# Patient Record
Sex: FEMALE | Race: WHITE | NOT HISPANIC OR LATINO | Employment: FULL TIME | URBAN - METROPOLITAN AREA
[De-identification: names, ages, dates, MRNs, and addresses within clinical notes are randomized per-mention and may not be internally consistent; named-entity substitution may affect disease eponyms.]

---

## 2018-05-12 ENCOUNTER — APPOINTMENT (EMERGENCY)
Dept: RADIOLOGY | Facility: HOSPITAL | Age: 23
End: 2018-05-12
Payer: COMMERCIAL

## 2018-05-12 ENCOUNTER — HOSPITAL ENCOUNTER (EMERGENCY)
Facility: HOSPITAL | Age: 23
Discharge: HOME | End: 2018-05-13
Attending: EMERGENCY MEDICINE
Payer: COMMERCIAL

## 2018-05-12 DIAGNOSIS — R10.9 ABDOMINAL PAIN: Primary | ICD-10-CM

## 2018-05-12 LAB
ALBUMIN SERPL BCP-MCNC: 3.6 G/DL (ref 3.5–5)
ALP SERPL-CCNC: 45 U/L (ref 46–116)
ALT SERPL W P-5'-P-CCNC: 21 U/L (ref 12–78)
ANION GAP SERPL CALCULATED.3IONS-SCNC: 8 MMOL/L (ref 4–13)
AST SERPL W P-5'-P-CCNC: 17 U/L (ref 5–45)
BACTERIA UR QL AUTO: ABNORMAL /HPF
BASOPHILS # BLD AUTO: 0.03 THOUSANDS/ΜL (ref 0–0.1)
BASOPHILS NFR BLD AUTO: 0 % (ref 0–1)
BILIRUB SERPL-MCNC: 0.45 MG/DL (ref 0.2–1)
BILIRUB UR QL STRIP: NEGATIVE
BUN SERPL-MCNC: 12 MG/DL (ref 5–25)
CALCIUM SERPL-MCNC: 9 MG/DL (ref 8.3–10.1)
CHLORIDE SERPL-SCNC: 104 MMOL/L (ref 100–108)
CLARITY UR: CLEAR
CO2 SERPL-SCNC: 25 MMOL/L (ref 21–32)
COLOR UR: YELLOW
COLOR, POC: YELLOW
CREAT SERPL-MCNC: 0.82 MG/DL (ref 0.6–1.3)
EOSINOPHIL # BLD AUTO: 0.16 THOUSAND/ΜL (ref 0–0.61)
EOSINOPHIL NFR BLD AUTO: 2 % (ref 0–6)
ERYTHROCYTE [DISTWIDTH] IN BLOOD BY AUTOMATED COUNT: 11.6 % (ref 11.6–15.1)
EXT PREG TEST URINE: NEGATIVE
GFR SERPL CREATININE-BSD FRML MDRD: 101 ML/MIN/1.73SQ M
GLUCOSE SERPL-MCNC: 102 MG/DL (ref 65–140)
GLUCOSE UR STRIP-MCNC: NEGATIVE MG/DL
HCT VFR BLD AUTO: 40.6 % (ref 34.8–46.1)
HGB BLD-MCNC: 13.8 G/DL (ref 11.5–15.4)
HGB UR QL STRIP.AUTO: ABNORMAL
HYALINE CASTS #/AREA URNS LPF: ABNORMAL /LPF
KETONES UR STRIP-MCNC: NEGATIVE MG/DL
LEUKOCYTE ESTERASE UR QL STRIP: NEGATIVE
LYMPHOCYTES # BLD AUTO: 2.72 THOUSANDS/ΜL (ref 0.6–4.47)
LYMPHOCYTES NFR BLD AUTO: 28 % (ref 14–44)
MCH RBC QN AUTO: 31.2 PG (ref 26.8–34.3)
MCHC RBC AUTO-ENTMCNC: 34 G/DL (ref 31.4–37.4)
MCV RBC AUTO: 92 FL (ref 82–98)
MONOCYTES # BLD AUTO: 1.13 THOUSAND/ΜL (ref 0.17–1.22)
MONOCYTES NFR BLD AUTO: 11 % (ref 4–12)
NEUTROPHILS # BLD AUTO: 5.82 THOUSANDS/ΜL (ref 1.85–7.62)
NEUTS SEG NFR BLD AUTO: 59 % (ref 43–75)
NITRITE UR QL STRIP: NEGATIVE
NON-SQ EPI CELLS URNS QL MICRO: ABNORMAL /HPF
NRBC BLD AUTO-RTO: 0 /100 WBCS
PH UR STRIP.AUTO: 7 [PH] (ref 4.5–8)
PLATELET # BLD AUTO: 243 THOUSANDS/UL (ref 149–390)
PMV BLD AUTO: 9 FL (ref 8.9–12.7)
POTASSIUM SERPL-SCNC: 3.2 MMOL/L (ref 3.5–5.3)
PROT SERPL-MCNC: 7.2 G/DL (ref 6.4–8.2)
PROT UR STRIP-MCNC: NEGATIVE MG/DL
RBC # BLD AUTO: 4.43 MILLION/UL (ref 3.81–5.12)
RBC #/AREA URNS AUTO: ABNORMAL /HPF
SODIUM SERPL-SCNC: 137 MMOL/L (ref 136–145)
SP GR UR STRIP.AUTO: 1.02 (ref 1–1.03)
UROBILINOGEN UR QL STRIP.AUTO: 1 E.U./DL
WBC # BLD AUTO: 9.88 THOUSAND/UL (ref 4.31–10.16)
WBC #/AREA URNS AUTO: ABNORMAL /HPF

## 2018-05-12 PROCEDURE — 81025 URINE PREGNANCY TEST: CPT | Performed by: EMERGENCY MEDICINE

## 2018-05-12 PROCEDURE — 81001 URINALYSIS AUTO W/SCOPE: CPT

## 2018-05-12 PROCEDURE — 85025 COMPLETE CBC W/AUTO DIFF WBC: CPT | Performed by: EMERGENCY MEDICINE

## 2018-05-12 PROCEDURE — 76856 US EXAM PELVIC COMPLETE: CPT

## 2018-05-12 PROCEDURE — 96375 TX/PRO/DX INJ NEW DRUG ADDON: CPT

## 2018-05-12 PROCEDURE — 96374 THER/PROPH/DIAG INJ IV PUSH: CPT

## 2018-05-12 PROCEDURE — 36415 COLL VENOUS BLD VENIPUNCTURE: CPT | Performed by: EMERGENCY MEDICINE

## 2018-05-12 PROCEDURE — 81002 URINALYSIS NONAUTO W/O SCOPE: CPT | Performed by: EMERGENCY MEDICINE

## 2018-05-12 PROCEDURE — 80053 COMPREHEN METABOLIC PANEL: CPT | Performed by: EMERGENCY MEDICINE

## 2018-05-12 RX ORDER — NORETHINDRONE ACETATE AND ETHINYL ESTRADIOL 1; 5 MG/1; UG/1
TABLET ORAL DAILY
COMMUNITY

## 2018-05-12 RX ORDER — ESCITALOPRAM OXALATE 10 MG/1
10 TABLET ORAL
COMMUNITY

## 2018-05-12 RX ORDER — MORPHINE SULFATE 2 MG/ML
2 INJECTION, SOLUTION INTRAMUSCULAR; INTRAVENOUS ONCE
Status: COMPLETED | OUTPATIENT
Start: 2018-05-12 | End: 2018-05-12

## 2018-05-12 RX ORDER — KETOROLAC TROMETHAMINE 30 MG/ML
15 INJECTION, SOLUTION INTRAMUSCULAR; INTRAVENOUS ONCE
Status: COMPLETED | OUTPATIENT
Start: 2018-05-12 | End: 2018-05-12

## 2018-05-12 RX ADMIN — MORPHINE SULFATE 2 MG: 2 INJECTION, SOLUTION INTRAMUSCULAR; INTRAVENOUS at 21:54

## 2018-05-12 RX ADMIN — KETOROLAC TROMETHAMINE 15 MG: 30 INJECTION, SOLUTION INTRAMUSCULAR at 21:54

## 2018-05-13 VITALS
RESPIRATION RATE: 18 BRPM | TEMPERATURE: 97.2 F | HEART RATE: 83 BPM | DIASTOLIC BLOOD PRESSURE: 78 MMHG | OXYGEN SATURATION: 98 % | HEIGHT: 68 IN | BODY MASS INDEX: 18.19 KG/M2 | WEIGHT: 120 LBS | SYSTOLIC BLOOD PRESSURE: 105 MMHG

## 2018-05-13 PROCEDURE — 99284 EMERGENCY DEPT VISIT MOD MDM: CPT

## 2018-05-13 RX ORDER — DICYCLOMINE HCL 20 MG
20 TABLET ORAL 2 TIMES DAILY
Qty: 20 TABLET | Refills: 0 | Status: SHIPPED | OUTPATIENT
Start: 2018-05-13 | End: 2019-01-21

## 2018-05-13 NOTE — ED ATTENDING ATTESTATION
Devon CUBA DO, saw and evaluated the patient  I have discussed the patient with the resident/non-physician practitioner and agree with the resident's/non-physician practitioner's findings, Plan of Care, and MDM as documented in the resident's/non-physician practitioner's note, except where noted  All available labs and Radiology studies were reviewed  At this point I agree with the current assessment done in the Emergency Department  I have conducted an independent evaluation of this patient a history and physical is as follows:      Critical Care Time  CritCare Time    Procedures     21 yr old fem to the ED with abd pain starting after she saw a movie  Had popcorn and a coke  Hx of abd pain frequently after eating  Tried to have BM but unable  Exm: tender in the LLQ above the pelvis with vol guarding  No distention or tympany  Hx of ovarian cysts in family  Due for menses  Used to have more pain with menses but better now with hormones  PlN eval for ovaries and sx tx

## 2018-05-13 NOTE — DISCHARGE INSTRUCTIONS
Abdominal Pain   WHAT YOU NEED TO KNOW:   Abdominal pain can be dull, achy, or sharp  You may have pain in one area of your abdomen, or in your entire abdomen  Your pain may be caused by a condition such as constipation, food sensitivity or poisoning, infection, or a blockage  Abdominal pain can also be from a hernia, appendicitis, or an ulcer  Liver, gallbladder, or kidney conditions can also cause abdominal pain  The cause of your abdominal pain may be unknown  DISCHARGE INSTRUCTIONS:   Return to the emergency department if:   · You have new chest pain or shortness of breath  · You have pulsing pain in your upper abdomen or lower back that suddenly becomes constant  · Your pain is in the right lower abdominal area and worsens with movement  · You have a fever over 100 4°F (38°C) or shaking chills  · You are vomiting and cannot keep food or liquids down  · Your pain does not improve or gets worse over the next 8 to 12 hours  · You see blood in your vomit or bowel movements, or they look black and tarry  · Your skin or the whites of your eyes turn yellow  · You are a woman and have a large amount of vaginal bleeding that is not your monthly period  Contact your healthcare provider if:   · You have pain in your lower back  · You are a man and have pain in your testicles  · You have pain when you urinate  · You have questions or concerns about your condition or care  Follow up with your healthcare provider within 24 hours or as directed:  Write down your questions so you remember to ask them during your visits  Medicines:   · Medicines  may be given to calm your stomach and prevent vomiting or to decrease pain  Ask how to take pain medicine safely  · Take your medicine as directed  Contact your healthcare provider if you think your medicine is not helping or if you have side effects  Tell him of her if you are allergic to any medicine   Keep a list of the medicines, vitamins, and herbs you take  Include the amounts, and when and why you take them  Bring the list or the pill bottles to follow-up visits  Carry your medicine list with you in case of an emergency  © 2017 [employer] Information is for End User's use only and may not be sold, redistributed or otherwise used for commercial purposes  All illustrations and images included in CareNotes® are the copyrighted property of A [employer] M [employer]  or Josh Abreu  The above information is an  only  It is not intended as medical advice for individual conditions or treatments  Talk to your doctor, nurse or pharmacist before following any medical regimen to see if it is safe and effective for you

## 2018-05-13 NOTE — ED PROVIDER NOTES
History  Chief Complaint   Patient presents with    Abdominal Pain     Pt c/o LLQ abd pain and diarrhea at the movies theatre with near syncopal episode  GCS 15 upon arrival      HPI       27-year-old female presents for abdominal pain  Sudden onset approximately 45 minutes prior to arrival   Left lower quadrant  Sharp shooting pain  Felt like she had to defecate and went to the bathroom minimally without relief  Felt dizzy and in terms of lightheadedness  Did not pass out  Who patient's mother reports she was in the bathroom for a long time and checked on her and she looked like she was not feeling well  Improved since onset  Still has left lower quadrant pain  Denies vaginal bleeding discharge dysuria  Patient is not sexually active  No other modifying factors no other associated symptoms  Patient reports that she does have symptoms of intestinal cramps some time to time but does not have a diagnosis  on exam the patient has left lower quadrant tenderness without rebound or guarding  Assessment plan: Left lower quadrant abdominal pain  Labs to evaluate for leukocytosis or electrolyte disturbance  UA for infection or pregnancy  Ultrasound of the pelvis to evaluate for ovarian torsion  Prior to Admission Medications   Prescriptions Last Dose Informant Patient Reported? Taking? Cetirizine HCl (ZYRTEC ALLERGY PO)   Yes Yes   Sig: Take by mouth   Probiotic Product (PROBIOTIC DAILY PO)   Yes Yes   Sig: Take by mouth   escitalopram (LEXAPRO) 10 mg tablet   Yes No   Sig: 10 mg   norethindrone-ethinyl estradiol (FEMHRT 1/5) 1-5 MG-MCG TABS   Yes Yes   Sig: Take by mouth daily      Facility-Administered Medications: None       Past Medical History:   Diagnosis Date    Abdominal pain        No past surgical history on file  No family history on file  I have reviewed and agree with the history as documented      Social History   Substance Use Topics    Smoking status: Never Smoker    Smokeless tobacco: Never Used    Alcohol use Yes      Comment: social        Review of Systems   Constitutional: Negative for chills, fatigue and fever  Eyes: Negative for photophobia and visual disturbance  Respiratory: Negative for cough and shortness of breath  Cardiovascular: Negative for chest pain, palpitations and leg swelling  Gastrointestinal: Positive for abdominal pain  Negative for diarrhea, nausea and vomiting  Endocrine: Negative for polydipsia and polyuria  Genitourinary: Negative for decreased urine volume, difficulty urinating, dysuria and frequency  Musculoskeletal: Negative for back pain, neck pain and neck stiffness  Skin: Negative for color change and rash  Allergic/Immunologic: Negative for environmental allergies and immunocompromised state  Neurological: Negative for dizziness and headaches  Hematological: Negative for adenopathy  Does not bruise/bleed easily  Psychiatric/Behavioral: Negative for dysphoric mood  The patient is not nervous/anxious  Physical Exam  ED Triage Vitals [05/12/18 2114]   Temperature Pulse Respirations Blood Pressure SpO2   (!) 97 2 °F (36 2 °C) 73 18 118/72 100 %      Temp Source Heart Rate Source Patient Position - Orthostatic VS BP Location FiO2 (%)   Tympanic Monitor Sitting Left arm --      Pain Score       Worst Possible Pain           Orthostatic Vital Signs  Vitals:    05/12/18 2114 05/12/18 2318 05/13/18 0043   BP: 118/72 109/53 105/78   Pulse: 73 74 83   Patient Position - Orthostatic VS: Sitting Lying        Physical Exam   Constitutional: She is oriented to person, place, and time  She appears well-developed and well-nourished  No distress  HENT:   Head: Normocephalic and atraumatic  Nose: Nose normal    Eyes: Conjunctivae and EOM are normal  Pupils are equal, round, and reactive to light  No scleral icterus  Neck: Normal range of motion  Neck supple  No JVD present  No tracheal deviation present   No thyromegaly present  Cardiovascular: Normal rate, regular rhythm, normal heart sounds and intact distal pulses  Exam reveals no gallop and no friction rub  Pulmonary/Chest: Effort normal and breath sounds normal  No respiratory distress  She has no wheezes  She has no rales  She exhibits no tenderness  Abdominal: Soft  Bowel sounds are normal  She exhibits no distension (llq) and no mass  There is tenderness  There is no rebound and no guarding  No hernia  Musculoskeletal: Normal range of motion  She exhibits no edema, tenderness or deformity  Neurological: She is alert and oriented to person, place, and time  She has normal reflexes  No cranial nerve deficit  Coordination normal    Skin: Skin is warm and dry  She is not diaphoretic  No erythema  Psychiatric: She has a normal mood and affect  Her behavior is normal    Nursing note and vitals reviewed        ED Medications  Medications   ketorolac (TORADOL) injection 15 mg (15 mg Intravenous Given 5/12/18 2154)   morphine injection 2 mg (2 mg Intravenous Given 5/12/18 2154)       Diagnostic Studies  Results Reviewed     Procedure Component Value Units Date/Time    Urine Microscopic [[id number]]  (Abnormal) Collected:  05/12/18 2154    Lab Status:  Final result Specimen:  Urine from Urine, Clean Catch Updated:  05/12/18 2232     RBC, UA 2-4 (A) /hpf      WBC, UA None Seen /hpf      Epithelial Cells None Seen /hpf      Bacteria, UA None Seen /hpf      Hyaline Casts, UA None Seen /lpf     Comprehensive metabolic panel [[id number]]  (Abnormal) Collected:  05/12/18 2151    Lab Status:  Final result Specimen:  Blood from Line, Venous Updated:  05/12/18 2226     Sodium 137 mmol/L      Potassium 3 2 (L) mmol/L      Chloride 104 mmol/L      CO2 25 mmol/L      Anion Gap 8 mmol/L      BUN 12 mg/dL      Creatinine 0 82 mg/dL      Glucose 102 mg/dL      Calcium 9 0 mg/dL      AST 17 U/L      ALT 21 U/L      Alkaline Phosphatase 45 (L) U/L      Total Protein 7 2 g/dL      Albumin 3 6 g/dL      Total Bilirubin 0 45 mg/dL      eGFR 101 ml/min/1 73sq m     Narrative:         National Kidney Disease Education Program recommendations are as follows:  GFR calculation is accurate only with a steady state creatinine  Chronic Kidney disease less than 60 ml/min/1 73 sq  meters  Kidney failure less than 15 ml/min/1 73 sq  meters      POCT urinalysis dipstick [[id number]]  (Normal) Resulted:  05/12/18 2149    Lab Status:  Final result Specimen:  Urine Updated:  05/12/18 2224     Color, UA Yellow    POCT pregnancy, urine [[id number]]  (Normal) Resulted:  05/12/18 2149    Lab Status:  Final result Updated:  05/12/18 2223     EXT PREG TEST UR (Ref: Negative) Negative    CBC and differential [[id number]] Collected:  05/12/18 2151    Lab Status:  Final result Specimen:  Blood from Line, Venous Updated:  05/12/18 2204     WBC 9 88 Thousand/uL      RBC 4 43 Million/uL      Hemoglobin 13 8 g/dL      Hematocrit 40 6 %      MCV 92 fL      MCH 31 2 pg      MCHC 34 0 g/dL      RDW 11 6 %      MPV 9 0 fL      Platelets 663 Thousands/uL      nRBC 0 /100 WBCs      Neutrophils Relative 59 %      Lymphocytes Relative 28 %      Monocytes Relative 11 %      Eosinophils Relative 2 %      Basophils Relative 0 %      Neutrophils Absolute 5 82 Thousands/µL      Lymphocytes Absolute 2 72 Thousands/µL      Monocytes Absolute 1 13 Thousand/µL      Eosinophils Absolute 0 16 Thousand/µL      Basophils Absolute 0 03 Thousands/µL     ED Urine Macroscopic [[id number]]  (Abnormal) Collected:  05/12/18 2154    Lab Status:  Final result Specimen:  Urine Updated:  05/12/18 2149     Color, UA Yellow     Clarity, UA Clear     pH, UA 7 0     Leukocytes, UA Negative     Nitrite, UA Negative     Protein, UA Negative mg/dl      Glucose, UA Negative mg/dl      Ketones, UA Negative mg/dl      Urobilinogen, UA 1 0 E U /dl      Bilirubin, UA Negative     Blood, UA Trace (A)     Specific [place], UA 1 020    Narrative:       [address] [place] [address] pelvis transabdominal only   Final Result by Roseanne Martinez MD (05/13 0032)       Normal                       Workstation performed: [id number]               Procedures  Procedures      Phone Consults  ED Phone Contact    ED Course                               MDM  Number of Diagnoses or Management Options  Abdominal pain: new and requires workup     Amount and/or Complexity of Data Reviewed  Clinical lab tests: ordered and reviewed  Tests in the radiology section of CPT®: reviewed and ordered  Tests in the medicine section of CPT®: reviewed and ordered  Independent visualization of images, tracings, or specimens: yes      CritCare Time    Disposition  Final diagnoses:   Abdominal pain     Time reflects when diagnosis was documented in both MDM as applicable and the Disposition within this note     Time User Action Codes Description Comment    5/13/2018 12:34 AM Dago Larger Add [R10 9] Abdominal pain       ED Disposition     ED Disposition Condition Comment    Discharge  Kimmy Akers discharge to home/self care  Condition at discharge: Good        Follow-up Information     Follow up With Specialties Details Why [address] [place] [address], DO Internal Medicine  As needed [address] [place] [address]          Discharge Medication List as of 5/13/2018 12:35 AM      CONTINUE these medications which have NOT CHANGED    Details   Cetirizine HCl (ZYRTEC ALLERGY PO) Take by mouth, Historical Med      norethindrone-ethinyl estradiol (FEMHRT 1/5) 1-5 MG-MCG TABS Take by mouth daily, Historical Med      Probiotic Product (PROBIOTIC DAILY PO) Take by mouth, Historical Med      escitalopram (LEXAPRO) 10 mg tablet 10 mg, Historical Med           No discharge procedures on file  ED Provider  Attending physically available and evaluated Kimmy Akers I managed the patient along with the ED Attending      Electronically Signed by         Jelly Duff DO  05/13/18 9041

## 2018-07-05 ENCOUNTER — OFFICE VISIT (OUTPATIENT)
Dept: GASTROENTEROLOGY | Facility: CLINIC | Age: 23
End: 2018-07-05
Payer: COMMERCIAL

## 2018-07-05 ENCOUNTER — APPOINTMENT (OUTPATIENT)
Dept: LAB | Facility: HOSPITAL | Age: 23
End: 2018-07-05
Attending: INTERNAL MEDICINE
Payer: COMMERCIAL

## 2018-07-05 ENCOUNTER — TELEPHONE (OUTPATIENT)
Dept: GASTROENTEROLOGY | Facility: CLINIC | Age: 23
End: 2018-07-05

## 2018-07-05 VITALS
HEART RATE: 86 BPM | BODY MASS INDEX: 19.37 KG/M2 | TEMPERATURE: 99 F | HEIGHT: 68 IN | SYSTOLIC BLOOD PRESSURE: 103 MMHG | WEIGHT: 127.8 LBS | DIASTOLIC BLOOD PRESSURE: 69 MMHG

## 2018-07-05 DIAGNOSIS — R10.13 EPIGASTRIC PAIN: Primary | ICD-10-CM

## 2018-07-05 DIAGNOSIS — R10.13 EPIGASTRIC PAIN: ICD-10-CM

## 2018-07-05 DIAGNOSIS — R10.84 GENERALIZED ABDOMINAL PAIN: Primary | ICD-10-CM

## 2018-07-05 DIAGNOSIS — R79.89 ABNORMAL C-REACTIVE PROTEIN: ICD-10-CM

## 2018-07-05 LAB — IGA SERPL-MCNC: 135 MG/DL (ref 70–400)

## 2018-07-05 PROCEDURE — 82784 ASSAY IGA/IGD/IGG/IGM EACH: CPT

## 2018-07-05 PROCEDURE — 36415 COLL VENOUS BLD VENIPUNCTURE: CPT

## 2018-07-05 PROCEDURE — 99244 OFF/OP CNSLTJ NEW/EST MOD 40: CPT | Performed by: INTERNAL MEDICINE

## 2018-07-05 PROCEDURE — 83516 IMMUNOASSAY NONANTIBODY: CPT

## 2018-07-05 RX ORDER — DICYCLOMINE HCL 20 MG
20 TABLET ORAL EVERY 6 HOURS
Qty: 120 TABLET | Refills: 5 | Status: SHIPPED | OUTPATIENT
Start: 2018-07-05 | End: 2019-03-09 | Stop reason: SDUPTHER

## 2018-07-05 NOTE — TELEPHONE ENCOUNTER
Dr Blair Chavira pt    Please call Lili Muñoz from [facility] outpatient lab in [facility] she is requesting to know if the patient should be having a 24hr urine or one sample  The patient is currently there so she would need a response as soon as possible  Thank you [phone]  Text sent to task PA

## 2018-07-05 NOTE — TELEPHONE ENCOUNTER
I believe this is supposed to be 24hrs  I re-entered it & called the lab but she apparently already gave a sample  Can you call & let the pt know    Thanks  Heriberto Plaza

## 2018-07-05 NOTE — PROGRESS NOTES
[facility] - Outpatient Consultation  Kateryna Braxton 21 y o  female MRN: [id number]  Encounter: [id number]          ASSESSMENT AND PLAN:      21year old female with symptoms suggestive of IBS  Will check H pylori, TTG, Urine PBG, as well as CRP and fecal calprotectin  Follow up in a few months time  ______________________________________________________________________    HPI:  21year old female referred by PCP due to abdominal pain  Not really related to food intake  She reports intense abdominal pain before a BM  Sometimes has abdominal pain  Also with some alternating constipation and diarrhea  She went to the movies recently and had some intense abdominal pain after eating popcorn  Weight is stable  Denies hematochezia  Denies family history of colon cancer  Only surgery was wisdom teeth surgery  REVIEW OF SYSTEMS:    CONSTITUTIONAL: Denies any fever, chills, rigors, and weight loss  HEENT: No earache or tinnitus  Denies hearing loss or visual disturbances  CARDIOVASCULAR: No chest pain or palpitations  RESPIRATORY: Denies any cough, hemoptysis, shortness of breath or dyspnea on exertion  GASTROINTESTINAL: As noted in the History of Present Illness  GENITOURINARY: No problems with urination  Denies any hematuria or dysuria  NEUROLOGIC: No dizziness or vertigo, denies headaches  MUSCULOSKELETAL: Denies any muscle or joint pain  SKIN: Denies skin rashes or itching  ENDOCRINE: Denies excessive thirst  Denies intolerance to heat or cold  PSYCHOSOCIAL: Denies depression or anxiety  Denies any recent memory loss  Historical Information   Past Medical History:   Diagnosis Date    Abdominal pain      History reviewed  No pertinent surgical history    Social History   History   Alcohol Use    Yes     Comment: social     History   Drug use: Unknown     History   Smoking Status    Never Smoker   Smokeless Tobacco    Never Used Family History   Problem Relation Age of Onset    Heart disease Mother     Colon cancer Maternal Grandfather     Heart disease Maternal Grandfather     Colon cancer Paternal Grandfather        Meds/Allergies       Current Outpatient Prescriptions:     Cetirizine HCl (ZYRTEC ALLERGY PO)    dicyclomine (BENTYL) 20 mg tablet    escitalopram (LEXAPRO) 10 mg tablet    norethindrone-ethinyl estradiol (FEMHRT 1/5) 1-5 MG-MCG TABS    Probiotic Product (PROBIOTIC DAILY PO)    No Known Allergies      Objective     Blood pressure 103/69, pulse 86, temperature 99 °F (37 2 °C), temperature source Tympanic, height 5' 8" (1 727 m), weight 58 kg (127 lb 12 8 oz)  Body mass index is 19 43 kg/m²  PHYSICAL EXAM:      General Appearance:   Alert, cooperative, no distress   HEENT:   Normocephalic, atraumatic, anicteric      Neck:  Supple, symmetrical, trachea midline   Lungs:   Clear to auscultation bilaterally; no rales, rhonchi or wheezing; respirations unlabored    Heart[de-identified]   Regular rate and rhythm; no murmur, rub, or gallop  Abdomen:   Soft, non-tender, non-distended; normal bowel sounds; no masses, no organomegaly    Genitalia:   Deferred    Rectal:   Deferred    Extremities:  No cyanosis, clubbing or edema    Pulses:  2+ and symmetric    Skin:  No jaundice, rashes, or lesions    Lymph nodes:  No palpable cervical lymphadenopathy        Lab Results:   No visits with results within 1 Day(s) from this visit     Latest known visit with results is:   Admission on 05/12/2018, Discharged on 05/13/2018   Component Date Value    WBC 05/12/2018 9 88     RBC 05/12/2018 4 43     Hemoglobin 05/12/2018 13 8     Hematocrit 05/12/2018 40 6     MCV 05/12/2018 92     MCH 05/12/2018 31 2     MCHC 05/12/2018 34 0     RDW 05/12/2018 11 6     MPV 05/12/2018 9 0     Platelets 36/55/7200 243     nRBC 05/12/2018 0     Neutrophils Relative 05/12/2018 59     Lymphocytes Relative 05/12/2018 28     Monocytes Relative 05/12/2018 11     Eosinophils Relative 05/12/2018 2     Basophils Relative 05/12/2018 0     Neutrophils Absolute 05/12/2018 5 82     Lymphocytes Absolute 05/12/2018 2 72     Monocytes Absolute 05/12/2018 1 13     Eosinophils Absolute 05/12/2018 0 16     Basophils Absolute 05/12/2018 0 03     Sodium 05/12/2018 137     Potassium 05/12/2018 3 2*    Chloride 05/12/2018 104     CO2 05/12/2018 25     Anion Gap 05/12/2018 8     BUN 05/12/2018 12     Creatinine 05/12/2018 0 82     Glucose 05/12/2018 102     Calcium 05/12/2018 9 0     AST 05/12/2018 17     ALT 05/12/2018 21     Alkaline Phosphatase 05/12/2018 45*    Total Protein 05/12/2018 7 2     Albumin 05/12/2018 3 6     Total Bilirubin 05/12/2018 0 45     eGFR 05/12/2018 101     Color, UA 05/12/2018 Yellow     EXT PREG TEST UR (Ref: N* 05/12/2018 Negative     Color, UA 05/12/2018 Yellow     Clarity, UA 05/12/2018 Clear     pH, UA 05/12/2018 7 0     Leukocytes, UA 05/12/2018 Negative     Nitrite, UA 05/12/2018 Negative     Protein, UA 05/12/2018 Negative     Glucose, UA 05/12/2018 Negative     Ketones, UA 05/12/2018 Negative     Urobilinogen, UA 05/12/2018 1 0     Bilirubin, UA 05/12/2018 Negative     Blood, UA 05/12/2018 Trace*    Specific Gravity, UA 05/12/2018 1 020     RBC, UA 05/12/2018 2-4*    WBC, UA 05/12/2018 None Seen     Epithelial Cells 05/12/2018 None Seen     Bacteria, UA 05/12/2018 None Seen     Hyaline Casts, UA 05/12/2018 None Seen          Radiology Results:   No results found

## 2018-07-05 NOTE — LETTER
July 5, 2018     Misael Escamilla DO  [facility] [address]    Patient: Rebecca Ruby   YOB: 1995   Date of Visit: 7/5/2018       Dear Dr Marguerite Sweet:    Thank you for referring Rebecca Ruby to me for evaluation  Below are my notes for this consultation  If you have questions, please do not hesitate to call me  I look forward to following your patient along with you  Sincerely,        Duncan Caraballo DO        CC: No Recipients  Duncan Caraballo DO  7/5/2018  3:51 PM  Sign at close encounter  [facility] - Outpatient Consultation  Rebecca Ruby 21 y o  female MRN: [id number]  Encounter: [id number]          ASSESSMENT AND PLAN:      21year old female with symptoms suggestive of IBS  Will check H pylori, TTG, Urine PBG, as well as CRP and fecal calprotectin  Follow up in a few months time  ______________________________________________________________________    HPI:  21year old female referred by PCP due to abdominal pain  Not really related to food intake  She reports intense abdominal pain before a BM  Sometimes has abdominal pain  Also with some alternating constipation and diarrhea  She went to the movies recently and had some intense abdominal pain after eating popcorn  Weight is stable  Denies hematochezia  Denies family history of colon cancer  Only surgery was wisdom teeth surgery  REVIEW OF SYSTEMS:    CONSTITUTIONAL: Denies any fever, chills, rigors, and weight loss  HEENT: No earache or tinnitus  Denies hearing loss or visual disturbances  CARDIOVASCULAR: No chest pain or palpitations  RESPIRATORY: Denies any cough, hemoptysis, shortness of breath or dyspnea on exertion  GASTROINTESTINAL: As noted in the History of Present Illness  GENITOURINARY: No problems with urination  Denies any hematuria or dysuria  NEUROLOGIC: No dizziness or vertigo, denies headaches     MUSCULOSKELETAL: Denies any muscle or joint pain  SKIN: Denies skin rashes or itching  ENDOCRINE: Denies excessive thirst  Denies intolerance to heat or cold  PSYCHOSOCIAL: Denies depression or anxiety  Denies any recent memory loss  Historical Information   Past Medical History:   Diagnosis Date    Abdominal pain      History reviewed  No pertinent surgical history  Social History   History   Alcohol Use    Yes     Comment: social     History   Drug use: Unknown     History   Smoking Status    Never Smoker   Smokeless Tobacco    Never Used     Family History   Problem Relation Age of Onset    Heart disease Mother     Colon cancer Maternal Grandfather     Heart disease Maternal Grandfather     Colon cancer Paternal Grandfather        Meds/Allergies       Current Outpatient Prescriptions:     Cetirizine HCl (ZYRTEC ALLERGY PO)    dicyclomine (BENTYL) 20 mg tablet    escitalopram (LEXAPRO) 10 mg tablet    norethindrone-ethinyl estradiol (FEMHRT 1/5) 1-5 MG-MCG TABS    Probiotic Product (PROBIOTIC DAILY PO)    No Known Allergies      Objective     Blood pressure 103/69, pulse 86, temperature 99 °F (37 2 °C), temperature source Tympanic, height 5' 8" (1 727 m), weight 58 kg (127 lb 12 8 oz)  Body mass index is 19 43 kg/m²  PHYSICAL EXAM:      General Appearance:   Alert, cooperative, no distress   HEENT:   Normocephalic, atraumatic, anicteric      Neck:  Supple, symmetrical, trachea midline   Lungs:   Clear to auscultation bilaterally; no rales, rhonchi or wheezing; respirations unlabored    Heart[de-identified]   Regular rate and rhythm; no murmur, rub, or gallop     Abdomen:   Soft, non-tender, non-distended; normal bowel sounds; no masses, no organomegaly    Genitalia:   Deferred    Rectal:   Deferred    Extremities:  No cyanosis, clubbing or edema    Pulses:  2+ and symmetric    Skin:  No jaundice, rashes, or lesions    Lymph nodes:  No palpable cervical lymphadenopathy        Lab Results:   No visits with results within 1 Day(s) from this visit     Latest known visit with results is:   Admission on 05/12/2018, Discharged on 05/13/2018   Component Date Value    WBC 05/12/2018 9 88     RBC 05/12/2018 4 43     Hemoglobin 05/12/2018 13 8     Hematocrit 05/12/2018 40 6     MCV 05/12/2018 92     MCH 05/12/2018 31 2     MCHC 05/12/2018 34 0     RDW 05/12/2018 11 6     MPV 05/12/2018 9 0     Platelets 43/23/1053 243     nRBC 05/12/2018 0     Neutrophils Relative 05/12/2018 59     Lymphocytes Relative 05/12/2018 28     Monocytes Relative 05/12/2018 11     Eosinophils Relative 05/12/2018 2     Basophils Relative 05/12/2018 0     Neutrophils Absolute 05/12/2018 5 82     Lymphocytes Absolute 05/12/2018 2 72     Monocytes Absolute 05/12/2018 1 13     Eosinophils Absolute 05/12/2018 0 16     Basophils Absolute 05/12/2018 0 03     Sodium 05/12/2018 137     Potassium 05/12/2018 3 2*    Chloride 05/12/2018 104     CO2 05/12/2018 25     Anion Gap 05/12/2018 8     BUN 05/12/2018 12     Creatinine 05/12/2018 0 82     Glucose 05/12/2018 102     Calcium 05/12/2018 9 0     AST 05/12/2018 17     ALT 05/12/2018 21     Alkaline Phosphatase 05/12/2018 45*    Total Protein 05/12/2018 7 2     Albumin 05/12/2018 3 6     Total Bilirubin 05/12/2018 0 45     eGFR 05/12/2018 101     Color, UA 05/12/2018 Yellow     EXT PREG TEST UR (Ref: N* 05/12/2018 Negative     Color, UA 05/12/2018 Yellow     Clarity, UA 05/12/2018 Clear     pH, UA 05/12/2018 7 0     Leukocytes, UA 05/12/2018 Negative     Nitrite, UA 05/12/2018 Negative     Protein, UA 05/12/2018 Negative     Glucose, UA 05/12/2018 Negative     Ketones, UA 05/12/2018 Negative     Urobilinogen, UA 05/12/2018 1 0     Bilirubin, UA 05/12/2018 Negative     Blood, UA 05/12/2018 Trace*    Specific Gravity, UA 05/12/2018 1 020     RBC, UA 05/12/2018 2-4*    WBC, UA 05/12/2018 None Seen     Epithelial Cells 05/12/2018 None Seen     Bacteria, UA 05/12/2018 None Seen     Hyaline Casts, UA 05/12/2018 None Seen          Radiology Results:   No results found

## 2018-07-05 NOTE — TELEPHONE ENCOUNTER
Tried calling the patient but her mail box is full so I couldn't leave a message I will try back later

## 2018-07-06 LAB — TTG IGA SER-ACNC: <2 U/ML (ref 0–3)

## 2018-07-09 ENCOUNTER — APPOINTMENT (OUTPATIENT)
Dept: LAB | Facility: HOSPITAL | Age: 23
End: 2018-07-09
Payer: COMMERCIAL

## 2018-07-09 PROCEDURE — 84110 ASSAY OF PORPHOBILINOGEN: CPT | Performed by: PHYSICIAN ASSISTANT

## 2018-07-11 LAB
PBG 24H UR-MRATE: 0.7 MG/24 HR (ref 0–1.5)
PBG UR-SCNC: 1.2 MG/L (ref 0–2)

## 2018-07-14 ENCOUNTER — APPOINTMENT (OUTPATIENT)
Dept: LAB | Facility: HOSPITAL | Age: 23
End: 2018-07-14
Attending: INTERNAL MEDICINE
Payer: COMMERCIAL

## 2018-07-14 DIAGNOSIS — R10.13 EPIGASTRIC PAIN: ICD-10-CM

## 2018-07-14 PROCEDURE — 83993 ASSAY FOR CALPROTECTIN FECAL: CPT

## 2018-07-14 PROCEDURE — 87338 HPYLORI STOOL AG IA: CPT

## 2018-07-16 LAB — H PYLORI AG STL QL IA: NEGATIVE

## 2018-07-22 LAB — CALPROTECTIN STL-MCNT: 26 UG/G (ref 0–120)

## 2018-12-19 ENCOUNTER — TELEPHONE (OUTPATIENT)
Dept: GASTROENTEROLOGY | Facility: MEDICAL CENTER | Age: 23
End: 2018-12-19

## 2018-12-19 NOTE — TELEPHONE ENCOUNTER
I called her, she continues to have symptoms that suggest IBS with alternating constipation and diarrhea associated with abdominal pain  She states that she predominantly has diarrhea  She did try the Bentyl but did not feel this was helpful, she was wondering about a daily medication for her abdominal pain that she recalls you mentioning at her office visit  Do you recall which medication this was? She was last seen in July and I asked her to make a follow-up visit in the office  Scheduling team:  Can you call her to schedule an office visit? She is wondering if we have any availability on 12/26/2018

## 2019-01-21 ENCOUNTER — OFFICE VISIT (OUTPATIENT)
Dept: GASTROENTEROLOGY | Facility: MEDICAL CENTER | Age: 24
End: 2019-01-21
Payer: COMMERCIAL

## 2019-01-21 VITALS
TEMPERATURE: 97.4 F | HEIGHT: 68 IN | SYSTOLIC BLOOD PRESSURE: 112 MMHG | BODY MASS INDEX: 18.94 KG/M2 | HEART RATE: 88 BPM | DIASTOLIC BLOOD PRESSURE: 72 MMHG | WEIGHT: 125 LBS

## 2019-01-21 DIAGNOSIS — R19.8 ABNORMAL BOWEL HABITS: ICD-10-CM

## 2019-01-21 DIAGNOSIS — R10.84 GENERALIZED ABDOMINAL PAIN: Primary | ICD-10-CM

## 2019-01-21 PROCEDURE — 99214 OFFICE O/P EST MOD 30 MIN: CPT | Performed by: PHYSICIAN ASSISTANT

## 2019-01-21 RX ORDER — AMITRIPTYLINE HYDROCHLORIDE 10 MG/1
TABLET, FILM COATED ORAL
Qty: 60 TABLET | Refills: 3 | Status: SHIPPED | OUTPATIENT
Start: 2019-01-21 | End: 2019-01-21 | Stop reason: SDUPTHER

## 2019-01-21 RX ORDER — AMITRIPTYLINE HYDROCHLORIDE 10 MG/1
TABLET, FILM COATED ORAL
Qty: 60 TABLET | Refills: 2 | Status: SHIPPED | OUTPATIENT
Start: 2019-01-21 | End: 2019-04-22 | Stop reason: SDUPTHER

## 2019-01-21 NOTE — PROGRESS NOTES
Assessment/Plan:     Diagnoses and all orders for this visit:    Generalized abdominal pain/ Abnormal bowel habits  She complains of abdominal pain and alternating bowel habits  She likely has IBS  She has had no abdominal imaging would recommend checking a ultrasound  If this is negative can consider CT scan  She has tried Bentyl without relief  She is requesting a medication to help with her pain  I would recommend amitriptyline at bedtime  She will have an EKG prior to this  She will start 10 mg x1 week and then increase to 20mg  I asked her to take this dose for 3-4 weeks and if there was still no improvement to call us  She currently lives in [place] and has started a new job and has minimal PTO time  We also discussed fiber which I would recommend adding to her diet to help with her alternating bowels  If her symptoms do not improve we can consider endoscopy/colonoscopy  -     US abdomen complete; Future  -     ECG 12 lead; Future  -     amitriptyline (ELAVIL) 10 mg tablet; Take 10mg q HS x 1 wk then increase to 20mg              Subjective:      Patient ID: Elzbieta Parsons is a 21 y o  female  HPI     This is a follow-up for abdominal pain and diarrhea  Patient was initially seen in July of last year  She had H pylori, celiac, fecal calprotectin and 24 hr urine collection at that time all of which were within normal limits  She describes her pain as daily, mostly after eating, most times which is relieved after a bowel movement  She states it could be very sharp at times  She has tried Bentyl without relief  She typically has loose frequent stools however at times can be constipated  She states she has tried to keep a food journal and has not been able to identify any triggers  She was seen once in the ER for these symptoms and had a pelvic ultrasound which was within normal limits  She has had no other abdominal imaging  She has never had an endoscopy or colonoscopy      Patient Active Problem List   Diagnosis    Generalized abdominal pain    Abnormal bowel habits     No Known Allergies  Current Outpatient Prescriptions on File Prior to Visit   Medication Sig    dicyclomine (BENTYL) 20 mg tablet Take 1 tablet (20 mg total) by mouth every 6 (six) hours    escitalopram (LEXAPRO) 10 mg tablet 10 mg    norethindrone-ethinyl estradiol (FEMHRT 1/5) 1-5 MG-MCG TABS Take by mouth daily    [DISCONTINUED] Probiotic Product (PROBIOTIC DAILY PO) Take by mouth    [DISCONTINUED] Cetirizine HCl (ZYRTEC ALLERGY PO) Take by mouth    [DISCONTINUED] dicyclomine (BENTYL) 20 mg tablet Take 1 tablet (20 mg total) by mouth 2 (two) times a day     No current facility-administered medications on file prior to visit  Family History   Problem Relation Age of Onset    Heart disease Mother     Colon cancer Maternal Grandfather     Heart disease Maternal Grandfather     Colon cancer Paternal Grandfather      Past Medical History:   Diagnosis Date    Abdominal pain      Social History     Social History    Marital status: Single     Spouse name: N/A    Number of children: N/A    Years of education: N/A     Social History Main Topics    Smoking status: Never Smoker    Smokeless tobacco: Never Used    Alcohol use Yes      Comment: social    Drug use: Unknown    Sexual activity: Not Asked     Other Topics Concern    None     Social History Narrative    None     No past surgical history on file  Review of Systems   All other systems reviewed and are negative  Objective:      /72 (BP Location: Left arm, Patient Position: Sitting, Cuff Size: Adult)   Pulse 88   Temp (!) 97 4 °F (36 3 °C) (Tympanic)   Ht 5' 8" (1 727 m)   Wt 56 7 kg (125 lb)   BMI 19 01 kg/m²          Physical Exam   Constitutional: She is oriented to person, place, and time  She appears well-developed and well-nourished  No distress  HENT:   Head: Normocephalic and atraumatic     Eyes: Conjunctivae and EOM are normal    Neck: Normal range of motion  Cardiovascular: Normal rate and regular rhythm  Pulmonary/Chest: Effort normal and breath sounds normal    Abdominal: Soft  Bowel sounds are normal  There is tenderness (Diffusely)  Musculoskeletal: Normal range of motion  Neurological: She is alert and oriented to person, place, and time  Skin: Skin is warm and dry  Psychiatric: She has a normal mood and affect   Her behavior is normal

## 2019-01-21 NOTE — LETTER
January 21, 2019     [facility] [address]    Patient: Ramiro Fuentes   YOB: 1995   Date of Visit: 1/21/2019       Dear Dr Ellis Just:    Thank you for referring Ramiro Fuenets to me for evaluation  Below are my notes for this consultation  If you have questions, please do not hesitate to call me  I look forward to following your patient along with you  Sincerely,        Jerald Lei PA-C        CC: No Recipients  Marciano Sanders  1/21/2019 10:01 AM  Sign at close encounter  Assessment/Plan:     Diagnoses and all orders for this visit:    Generalized abdominal pain/ Abnormal bowel habits  She complains of abdominal pain and alternating bowel habits  She likely has IBS  She has had no abdominal imaging would recommend checking a ultrasound  If this is negative can consider CT scan  She has tried Bentyl without relief  She is requesting a medication to help with her pain  I would recommend amitriptyline at bedtime  She will have an EKG prior to this  She will start 10 mg x1 week and then increase to 20mg  I asked her to take this dose for 3-4 weeks and if there was still no improvement to call us  She currently lives in [place] and has started a new job and has minimal PTO time  We also discussed fiber which I would recommend adding to her diet to help with her alternating bowels  If her symptoms do not improve we can consider endoscopy/colonoscopy  -     US abdomen complete; Future  -     ECG 12 lead; Future  -     amitriptyline (ELAVIL) 10 mg tablet; Take 10mg q HS x 1 wk then increase to 20mg              Subjective:      Patient ID: Ramiro Fuentes is a 21 y o  female  HPI     This is a follow-up for abdominal pain and diarrhea  Patient was initially seen in July of last year  She had H pylori, celiac, fecal calprotectin and 24 hr urine collection at that time all of which were within normal limits    She describes her pain as daily, mostly after eating, most times which is relieved after a bowel movement  She states it could be very sharp at times  She has tried Bentyl without relief  She typically has loose frequent stools however at times can be constipated  She states she has tried to keep a food journal and has not been able to identify any triggers  She was seen once in the ER for these symptoms and had a pelvic ultrasound which was within normal limits  She has had no other abdominal imaging  She has never had an endoscopy or colonoscopy  Patient Active Problem List   Diagnosis    Generalized abdominal pain    Abnormal bowel habits     No Known Allergies  Current Outpatient Prescriptions on File Prior to Visit   Medication Sig    dicyclomine (BENTYL) 20 mg tablet Take 1 tablet (20 mg total) by mouth every 6 (six) hours    escitalopram (LEXAPRO) 10 mg tablet 10 mg    norethindrone-ethinyl estradiol (FEMHRT 1/5) 1-5 MG-MCG TABS Take by mouth daily    [DISCONTINUED] Probiotic Product (PROBIOTIC DAILY PO) Take by mouth    [DISCONTINUED] Cetirizine HCl (ZYRTEC ALLERGY PO) Take by mouth    [DISCONTINUED] dicyclomine (BENTYL) 20 mg tablet Take 1 tablet (20 mg total) by mouth 2 (two) times a day     No current facility-administered medications on file prior to visit        Family History   Problem Relation Age of Onset    Heart disease Mother     Colon cancer Maternal Grandfather     Heart disease Maternal Grandfather     Colon cancer Paternal Grandfather      Past Medical History:   Diagnosis Date    Abdominal pain      Social History     Social History    Marital status: Single     Spouse name: N/A    Number of children: N/A    Years of education: N/A     Social History Main Topics    Smoking status: Never Smoker    Smokeless tobacco: Never Used    Alcohol use Yes      Comment: social    Drug use: Unknown    Sexual activity: Not Asked     Other Topics Concern    None     Social History Narrative    None     No past surgical history on file  Review of Systems   All other systems reviewed and are negative  Objective:      /72 (BP Location: Left arm, Patient Position: Sitting, Cuff Size: Adult)   Pulse 88   Temp (!) 97 4 °F (36 3 °C) (Tympanic)   Ht 5' 8" (1 727 m)   Wt 56 7 kg (125 lb)   BMI 19 01 kg/m²           Physical Exam   Constitutional: She is oriented to person, place, and time  She appears well-developed and well-nourished  No distress  HENT:   Head: Normocephalic and atraumatic  Eyes: Conjunctivae and EOM are normal    Neck: Normal range of motion  Cardiovascular: Normal rate and regular rhythm  Pulmonary/Chest: Effort normal and breath sounds normal    Abdominal: Soft  Bowel sounds are normal  There is tenderness (Diffusely)  Musculoskeletal: Normal range of motion  Neurological: She is alert and oriented to person, place, and time  Skin: Skin is warm and dry  Psychiatric: She has a normal mood and affect   Her behavior is normal

## 2019-03-09 DIAGNOSIS — R10.13 EPIGASTRIC PAIN: ICD-10-CM

## 2019-03-09 RX ORDER — DICYCLOMINE HCL 20 MG
TABLET ORAL
Qty: 120 TABLET | Refills: 5 | Status: SHIPPED | OUTPATIENT
Start: 2019-03-09

## 2019-04-22 DIAGNOSIS — R10.84 GENERALIZED ABDOMINAL PAIN: ICD-10-CM

## 2019-04-22 RX ORDER — AMITRIPTYLINE HYDROCHLORIDE 10 MG/1
TABLET, FILM COATED ORAL
Qty: 60 TABLET | Refills: 2 | Status: SHIPPED | OUTPATIENT
Start: 2019-04-22 | End: 2019-07-16 | Stop reason: SDUPTHER

## 2019-07-16 DIAGNOSIS — R10.84 GENERALIZED ABDOMINAL PAIN: ICD-10-CM

## 2019-07-16 RX ORDER — AMITRIPTYLINE HYDROCHLORIDE 10 MG/1
TABLET, FILM COATED ORAL
Qty: 60 TABLET | Refills: 2 | Status: SHIPPED | OUTPATIENT
Start: 2019-07-16 | End: 2019-08-02 | Stop reason: SDUPTHER

## 2019-08-02 DIAGNOSIS — R10.84 GENERALIZED ABDOMINAL PAIN: ICD-10-CM

## 2019-08-02 RX ORDER — AMITRIPTYLINE HYDROCHLORIDE 10 MG/1
20 TABLET, FILM COATED ORAL
Qty: 180 TABLET | Refills: 2 | Status: SHIPPED | OUTPATIENT
Start: 2019-08-02

## 2019-08-02 NOTE — TELEPHONE ENCOUNTER
DR SOLORZANO'S PT      Refill Request for Medication: AMITRIPTYLINE    Dose: 10MG    Quantity: 60    Pharmacy: [facility]